# Patient Record
Sex: FEMALE | Race: WHITE
[De-identification: names, ages, dates, MRNs, and addresses within clinical notes are randomized per-mention and may not be internally consistent; named-entity substitution may affect disease eponyms.]

---

## 2019-08-13 ENCOUNTER — HOSPITAL ENCOUNTER (EMERGENCY)
Dept: HOSPITAL 97 - ER | Age: 54
Discharge: HOME | End: 2019-08-13
Payer: SELF-PAY

## 2019-08-13 DIAGNOSIS — E87.6: ICD-10-CM

## 2019-08-13 DIAGNOSIS — F10.129: ICD-10-CM

## 2019-08-13 DIAGNOSIS — F55.8: ICD-10-CM

## 2019-08-13 DIAGNOSIS — M79.622: Primary | ICD-10-CM
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
## 2019-08-13 LAB
ALBUMIN SERPL BCP-MCNC: 3.3 G/DL (ref 3.4–5)
ALP SERPL-CCNC: 84 U/L (ref 45–117)
ALT SERPL W P-5'-P-CCNC: 30 U/L (ref 12–78)
AST SERPL W P-5'-P-CCNC: 28 U/L (ref 15–37)
BUN BLD-MCNC: 13 MG/DL (ref 7–18)
GLUCOSE SERPLBLD-MCNC: 116 MG/DL (ref 74–106)
HCT VFR BLD CALC: 34.3 % (ref 36–45)
INR BLD: 0.96
LYMPHOCYTES # SPEC AUTO: 1.6 K/UL (ref 0.7–4.9)
METHAMPHET UR QL SCN: POSITIVE
PMV BLD: 7.3 FL (ref 7.6–11.3)
POTASSIUM SERPL-SCNC: 3.2 MMOL/L (ref 3.5–5.1)
RBC # BLD: 3.99 M/UL (ref 3.86–4.86)
THC SERPL-MCNC: NEGATIVE NG/ML

## 2019-08-13 PROCEDURE — 80048 BASIC METABOLIC PNL TOTAL CA: CPT

## 2019-08-13 PROCEDURE — 96368 THER/DIAG CONCURRENT INF: CPT

## 2019-08-13 PROCEDURE — 80307 DRUG TEST PRSMV CHEM ANLYZR: CPT

## 2019-08-13 PROCEDURE — 71045 X-RAY EXAM CHEST 1 VIEW: CPT

## 2019-08-13 PROCEDURE — 85025 COMPLETE CBC W/AUTO DIFF WBC: CPT

## 2019-08-13 PROCEDURE — 82962 GLUCOSE BLOOD TEST: CPT

## 2019-08-13 PROCEDURE — 80320 DRUG SCREEN QUANTALCOHOLS: CPT

## 2019-08-13 PROCEDURE — 93005 ELECTROCARDIOGRAM TRACING: CPT

## 2019-08-13 PROCEDURE — 99285 EMERGENCY DEPT VISIT HI MDM: CPT

## 2019-08-13 PROCEDURE — 85730 THROMBOPLASTIN TIME PARTIAL: CPT

## 2019-08-13 PROCEDURE — 80329 ANALGESICS NON-OPIOID 1 OR 2: CPT

## 2019-08-13 PROCEDURE — 96365 THER/PROPH/DIAG IV INF INIT: CPT

## 2019-08-13 PROCEDURE — 70450 CT HEAD/BRAIN W/O DYE: CPT

## 2019-08-13 PROCEDURE — 85610 PROTHROMBIN TIME: CPT

## 2019-08-13 PROCEDURE — 80076 HEPATIC FUNCTION PANEL: CPT

## 2019-08-13 PROCEDURE — 36415 COLL VENOUS BLD VENIPUNCTURE: CPT

## 2019-08-13 PROCEDURE — 81003 URINALYSIS AUTO W/O SCOPE: CPT

## 2019-08-13 NOTE — RAD REPORT
EXAM DESCRIPTION:  CT - Ct Stroke Brain Wo Cont - 8/13/2019 8:23 pm

 

CLINICAL HISTORY:  SLURRED SPEECH

CVA symptomology.

 

COMPARISON:  No comparisons

 

TECHNIQUE:  All CT scans are performed using dose optimization technique as appropriate and may inclu
de automated exposure control or mA/KV adjustment according to patient size.

 

FINDINGS:  No intracranial hemorrhage, hydrocephalus or extra-axial fluid collection.No areas of brai
n edema or evidence of midline shift.

 

The paranasal sinuses and mastoids are clear. The calvarium is intact.

 

IMPRESSION:  No acute intracranial abnormality.

 

 The findings were discussed with ER physician Dr. Rosenthal on 08/13/2019 at 8:17 p.m. by telephone.

## 2019-08-13 NOTE — RAD REPORT
EXAM DESCRIPTION:  RAD - Humerus Left - 8/13/2019 9:13 pm

 

CLINICAL HISTORY:  PAIN

 

COMPARISON:  <Comparisons>

 

FINDINGS:  No acute fracture or dislocation evident.

## 2019-08-13 NOTE — RAD REPORT
EXAM DESCRIPTION:  RAD - Chest Single View - 8/13/2019 8:29 pm

 

CLINICAL HISTORY:  stroke protocol

Chest pain.

 

COMPARISON:  No comparisons

 

FINDINGS:  Portable technique limits examination quality.

 

The lungs are grossly clear. The heart is mildly prominent in size. No displaced fractures.

 

IMPRESSION:  No acute intrathoracic process suspected.

## 2019-08-13 NOTE — EDPHYS
Physician Documentation                                                                           

 [facility]                                                                 

Name: Snow Maynard                                                                             

Age: 53 yrs                                                                                       

Sex: Female                                                                                       

: 1965                                                                                   

MRN: [id number]                                                                                   

Arrival Date: 2019                                                                          

Time: 20:01                                                                                       

Account#: [id number]                                                                            

Bed 2                                                                                             

Private MD:                                                                                       

ED Physician Adrien Chen                                                                      

HPI:                                                                                              

                                                                                             

20:21 This 53 yrs old  Female presents to ER via Wheelchair with complaints of Arm   suellen 

      Pain.                                                                                       

20:21 The patient or guardian complains of.                                                   suellen 

                                                                                                  

OB/GYN:                                                                                           

20:17 LMP N/A - Hysterectomy                                                                  jd3 

                                                                                                  

Historical:                                                                                       

- Allergies:                                                                                      

20:17 No Known Allergies;                                                                     jd3 

- Home Meds:                                                                                      

20:17 None [Active];                                                                          jd3 

- PMHx:                                                                                           

20:17 Hypertension;                                                                           jd3 

- PSHx:                                                                                           

20:17 Hysterectomy;                                                                           jd3 

                                                                                                  

- Immunization history:: Adult Immunizations unknown.                                             

- Social history:: Smoking status: unknown.                                                       

- Ebola Screening: : Patient negative for fever greater than or equal to 101.5 degrees            

  Fahrenheit, and additional compatible Ebola Virus Disease symptoms.                             

                                                                                                  

                                                                                                  

ROS:                                                                                              

21:00 Constitutional: Negative for fever, chills, and weight loss, Eyes: Negative for injury, suellen 

      pain, redness, and discharge, ENT: Negative for injury, pain, and discharge, Neck:          

      Negative for injury, pain, and swelling, Cardiovascular: Negative for chest pain,           

      palpitations, and edema, Respiratory: Negative for shortness of breath, cough,              

      wheezing, and pleuritic chest pain, Abdomen/GI: Negative for abdominal pain, nausea,        

      vomiting, diarrhea, and constipation, Back: Negative for injury and pain, : Negative      

      for injury, bleeding, discharge, and swelling, Skin: Negative for injury, rash, and         

      discoloration, Neuro: Negative for headache, weakness, numbness, tingling, and seizure,     

      Psych: Negative for depression, anxiety, suicide ideation, homicidal ideation, and          

      hallucinations, Allergy/Immunology: Negative for hives, rash, and allergies, Endocrine:     

      Negative for neck swelling, polydipsia, polyuria, polyphagia, and marked weight             

      changes, Hematologic/Lymphatic: Negative for swollen nodes, abnormal bleeding, and          

      unusual bruising.                                                                           

21:00 MS/extremity: Positive for injury or acute deformity, decreased range of motion, pain,      

      of the anterior aspect of left shoulder, left bicep, posterior aspect of left shoulder      

      and left tricep.                                                                            

                                                                                                  

Exam:                                                                                             

21:00 Constitutional:  This is a well developed, well nourished patient who is awake, alert,  suellen 

      and in no acute distress. Head/Face:  Normocephalic, atraumatic. Eyes:  Pupils equal        

      round and reactive to light, extra-ocular motions intact.  Lids and lashes normal.          

      Conjunctiva and sclera are non-icteric and not injected.  Cornea within normal limits.      

      Periorbital areas with no swelling, redness, or edema. ENT:  Nares patent. No nasal         

      discharge, no septal abnormalities noted.  Tympanic membranes are normal and external       

      auditory canals are clear.  Oropharynx with no redness, swelling, or masses, exudates,      

      or evidence of obstruction, uvula midline.  Mucous membranes moist. Neck:  Trachea          

      midline, no thyromegaly or masses palpated, and no cervical lymphadenopathy.  Supple,       

      full range of motion without nuchal rigidity, or vertebral point tenderness.  No            

      Meningismus. Chest/axilla:  Normal chest wall appearance and motion.  Nontender with no     

      deformity.  No lesions are appreciated. Cardiovascular:  Regular rate and rhythm with a     

      normal S1 and S2.  No gallops, murmurs, or rubs.  Normal PMI, no JVD.  No pulse             

      deficits. Respiratory:  Lungs have equal breath sounds bilaterally, clear to                

      auscultation and percussion.  No rales, rhonchi or wheezes noted.  No increased work of     

      breathing, no retractions or nasal flaring. Abdomen/GI:  Soft, non-tender, with normal      

      bowel sounds.  No distension or tympany.  No guarding or rebound.  No evidence of           

      tenderness throughout. Back:  No spinal tenderness.  No costovertebral tenderness.          

      Full range of motion. Skin:  Warm, dry with normal turgor.  Normal color with no            

      rashes, no lesions, and no evidence of cellulitis. Neuro:  Awake and alert, GCS 15,         

      oriented to person, place, time, and situation.  Cranial nerves II-XII grossly intact.      

      Motor strength 5/5 in all extremities.  Sensory grossly intact.  Cerebellar exam            

      normal.  Normal gait. Psych:  Awake, alert, with orientation to person, place and time.     

       Behavior, mood, and affect are within normal limits.                                       

21:00 Musculoskeletal/extremity: ROM: limited active range of motion, limited passive range       

      of motion, limited active range of motion due to pain, limited passive range of motion      

      due to pain, Circulation is intact in all extremities. Sensation intact. Compartment        

      Syndrome exam of affected extremity: is normal. DVT Exam: no swelling, negative Homans'     

      sign noted on exam, no appreciated bluish discoloration, no erythema, no increased          

      warmth, pain, tenderness.                                                                   

                                                                                                  

Vital Signs:                                                                                      

20:17  / 97; Pulse 118; Resp 19 S; Temp 98.5(TE); Pulse Ox 100% on R/A; Weight 61.23 kg jd3 

      (R); Height 5 ft. 4 in. (162.56 cm) (R); Pain 6/10;                                         

21:22  / 88; Pulse 94; Resp 18; Pulse Ox 100% on R/A;                                   ao  

22:05  / 70; Pulse 90; Resp 16; Pulse Ox 99% on R/A;                                    rr5 

20:17 Body Mass Index 23.17 (61.23 kg, 162.56 cm)                                             jd3 

                                                                                                  

NIH Stroke Scale Scores:                                                                          

21:51 NIHSS Score: 0                                                                          suellen 

23:11 NIHSS Score: 5                                                                          ao  

                                                                                                  

Sigurd Coma Score:                                                                               

20:15 Eye Response: spontaneous(4). Verbal Response: oriented(5). Motor Response: obeys       rr5 

      commands(6). Total: 15.                                                                     

22:05 Eye Response: spontaneous(4). Verbal Response: oriented(5). Motor Response: obeys       rr5 

      commands(6). Total: 15.                                                                     

                                                                                                  

MDM:                                                                                              

20:11 Patient medically screened.                                                             [facility] 

21:01 Data reviewed: vital signs, nurses notes, lab test result(s), EKG, radiologic studies,  [facility] 

      CT scan, plain films.                                                                       

                                                                                                  

                                                                                             

20:18 Order name: Basic Metabolic Panel; Complete Time: 21:29                                   

                                                                                             

20:18 Order name: CBC with Diff; Complete Time: 20:59                                           

                                                                                             

20:18 Order name: Protime (+inr); Complete Time: 20:59                                          

                                                                                             

20:18 Order name: Ptt, Activated; Complete Time: 20:59                                          

                                                                                             

20:20 Order name: ETOH Level; Complete Time: 20:59                                            [facility] 

                                                                                             

20:18 Order name: CT Stroke Brain w/o Contrast; Complete Time: 20:59                            

                                                                                             

20:18 Order name: Stroke CXR 1 View; Complete Time: 20:59                                       

                                                                                             

20:20 Order name: Salicylate; Complete Time: 21:29                                            [facility] 

                                                                                             

20:20 Order name: Urine Drug Screen                                                           [facility] 

                                                                                             

20:37 Order name: Liver (Hepatic) Function; Complete Time: 21:29                              [facility]

                                                                                             

20:37 Order name: Acetaminophen Level; Complete Time: 21:29                                   [facility]

                                                                                             

22:04 Order name: Urine Dipstick--Ancillary (enter results)                                   ag4 

                                                                                             

20:18 Order name: EKG; Complete Time: 20:20                                                     

                                                                                             

20:18 Order name: Accucheck; Complete Time: 20:                                               

                                                                                             

20:18 Order name: Cardiac monitoring; Complete Time: 20:                                      

                                                                                             

20:18 Order name: EKG - Nurse/Tech; Complete Time: 20:                                        

                                                                                             

20:18 Order name: IV Saline Lock; Complete Time: 20:                                          

                                                                                             

20:18 Order name: Labs collected and sent; Complete Time: 20:                                 

                                                                                             

20:18 Order name: NPO; Complete Time: 20:                                                     

                                                                                             

20:18 Order name: O2 Per Protocol; Complete Time: 20:                                         

                                                                                             

20:18 Order name: O2 Sat Monitoring; Complete Time: 20:                                       

                                                                                             

20:18 Order name: Stroke Swallow Screen; Complete Time: 20:                                   

                                                                                             

20:59 Order name: Humerus Left XRAY                                                           [facility] 

                                                                                             

20:20 Order name: Urine Dipstick-Ancillary (obtain specimen); Complete Time: 22:12            [facility] 

                                                                                             

21:00 Order name: Sling; Complete Time: 22:10                                                 [facility] 

                                                                                                  

Administered Medications:                                                                         

20:31 Drug: NS 0.9% 1000 ml Route: IV; Rate: 1 bolus; Site: right forearm;                    rr5 

22:00 Follow up: Response: No adverse reaction; IV Status: Completed infusion; IV Intake:     rr5 

      1000ml                                                                                      

20:31 Drug: Thiamine 100 mg Route: IV; Rate: bolus; Site: right forearm;                      rr5 

22:00 Follow up: Response: No adverse reaction; IV Status: Completed infusion                 rr5 

20:31 Drug: foLIC Acid 1 mg Route: IVPB; Site: right forearm;                                 rr5 

22:00 Follow up: Response: No adverse reaction; IV Status: Completed infusion                 rr5 

21:11 Drug: Aspirin Chewable Tablet 162 mg Route: PO;                                         rr5 

22:11 Follow up: Response: No adverse reaction                                                rr5 

22:00 Drug: Potassium Effervescent Tablet 50 mEq Route: PO;                                   rr5 

22:15 Follow up: Response: Medication administered at discharge.                              rr5 

                                                                                                  

                                                                                                  

Point of Care Testing:                                                                            

      Blood Glucose:                                                                              

20:17 Blood Glucose: 120 mg/dL;                                                               jd3 

      Ranges:                                                                                     

      Critical Glucose Levels:Adult <50 mg/dl or >400 mg/dl  <40 mg/dl or >180 mg/dl       

Disposition:                                                                                      

19 21:31 Discharged to Home. Impression: Fall due to bumping against object, Pain in        

  left upper arm - contusion, Alcohol abuse with intoxication, Abuse of                           

  non-psychoactive substances, Hypokalemia.                                                       

- Condition is Stable.                                                                            

- Discharge Instructions: Alcohol Intoxication, Potassium Content of Foods, Fall                  

  Prevention in the Home, Musculoskeletal Pain, Substance Use Disorder, Alcohol                   

  Intoxication, Easy-to-Read, Alcohol Abuse and Nutrition, Fall Prevention in the Home,           

  Easy-to-Read, Aspirin and Your Heart, Hypokalemia.                                              

                                                                                                  

- Medication Reconciliation Form, Thank You Letter, Antibiotic Education, Prescription            

  Opioid Use form.                                                                                

- Follow up: Private Physician; When: 2 - 3 days; Reason: Recheck today's complaints,             

  Continuance of care, Re-evaluation by your physician.                                           

- Problem is new.                                                                                 

- Symptoms have improved.                                                                         

                                                                                                  

                                                                                                  

                                                                                                  

                                                                                                  

NIH Stroke Scale - NIH Stroke Score                                                               

Date: 2019                                                                                  

Time: 21:51                                                                                       

Total Score = 0                                                                                   

  1a. Level of Consciousness (LOC) - 0(Alert)                                                     

  1b. Level of Consciousness (LOC) (Year \T\ Age) - 0(Both)                                       

  1c. LOC Commands (Open \T\ Closes Eyes/) - 0(Both)                                          

   2. Best Gaze (Lateral Gaze Paresis) - 0(Normal)                                                

   3. Visual Field Loss - 0(No visual loss)                                                       

   4. Facial Palsy - 0(Normal)                                                                    

  5a. Left Arm: Motor (10-second hold) - 0(No drift)                                              

  5b. Right Arm: Motor (10-second hold) - 0(No drift)                                             

  6a. Left Leg: Motor (5-second hold - always test supine) - 0(No drift)                          

  6b. Right Leg: Motor (5-second hold - always test supine) - 0(No drift)                         

   7. Limb Ataxia (finger/nose \T\ heel/shin - test with eyes open) - 0(Absent)                   

   8. Sensory Loss (pinprick arms/legs/face) - 0(Normal)                                          

   9. Best Language: Aphasia (description/naming/reading) - 0(No aphasia)                         

  10. Dysarthria (speech clarity - read or repeat words) - 0(Normal)                              

  11. Extinction and Inattention (visual/tactile/auditory/spatial/personal) - 0(No                

      abnormality)                                                                                

Initials: suellen                                                                                     

                                                                                                  

                                                                                                  

NIH Stroke Scale - NIH Stroke Score                                                               

Date: 2019                                                                                  

Time: 23:11                                                                                       

Total Score = 5                                                                                   

  1a. Level of Consciousness (LOC) - 0(Alert)                                                     

  1b. Level of Consciousness (LOC) (Year \T\ Age) - 0(Both)                                       

  1c. LOC Commands (Open \T\ Closes Eyes/) - 0(Both)                                          

   2. Best Gaze (Lateral Gaze Paresis) - 0(Normal)                                                

   3. Visual Field Loss - 0(No visual loss)                                                       

   4. Facial Palsy - 0(Normal)                                                                    

  5a. Left Arm: Motor (10-second hold) - 1(Drift)                                                 

  5b. Right Arm: Motor (10-second hold) - 1(Drift)                                                

  6a. Left Leg: Motor (5-second hold - always test supine) - 1(Drift)                             

  6b. Right Leg: Motor (5-second hold - always test supine) - 1(Drift)                            

   7. Limb Ataxia (finger/nose \T\ heel/shin - test with eyes open) - 0(Absent)                   

   8. Sensory Loss (pinprick arms/legs/face) - 0(Normal)                                          

   9. Best Language: Aphasia (description/naming/reading) - 1(Mild to moderate                    

      aphasia)                                                                                    

  10. Dysarthria (speech clarity - read or repeat words) - 0(Normal)                              

  11. Extinction and Inattention (visual/tactile/auditory/spatial/personal) - 0(No                

      abnormality)                                                                                

Initials: ao                                                                                      

                                                                                                  

Signatures:                                                                                       

Dispatcher [employer]                           EDMS                                                 

Adrien Chen MD MD cha Ballard, Brenda, RN RN bb Davies, Jonathon, RN RN [id number] Roque, Raymond, RN                      RN   rr5                                                  

                                                                                                  

Corrections: (The following items were deleted from the chart)                                    

20:37 20:21 ACETAMINOPHEN+C.LAB.BRZ ordered. EDMS                                     EDMS        

20:37 20:21 HEPATIC FUNCTION+C.LAB.BRZ ordered. EDMS                                  EDMS        

22:14 21:31 2019 21:31 Discharged to Home. Impression: Fall due to bumping      rr5         

      against object; Pain in left upper arm - contusion; Alcohol abuse with                      

      intoxication; Abuse of non-psychoactive substances; Hypokalemia. Condition is               

      Stable. Discharge Instructions: Alcohol Intoxication, Fall Prevention in the                

      Home, Musculoskeletal Pain, Substance Use Disorder, Alcohol Intoxication,                   

      Easy-to-Read, Alcohol Abuse and Nutrition, Fall Prevention in the Home,                     

      Easy-to-Read, Aspirin and Your Heart, Potassium Content of Foods, Hypokalemia.              

      Forms are Medication Reconciliation Form, Thank You Letter, Antibiotic                      

      Education, Prescription Opioid Use. Follow up: Private Physician; When: 2 - 3               

      days; Reason: Recheck today's complaints, Continuance of care, Re-evaluation by             

      your physician. Problem is new. Symptoms have improved. suellen                                 

                                                                                                  

**************************************************************************************************

## 2019-08-13 NOTE — ER
Nurse's Notes                                                                                     

 [facility]                                                                 

Name: Snow Maynard                                                                             

Age: 53 yrs                                                                                       

Sex: Female                                                                                       

: 1965                                                                                   

MRN: [id number]                                                                                   

Arrival Date: 2019                                                                          

Time: 20:01                                                                                       

Account#: [id number]                                                                            

Bed 2                                                                                             

Private MD:                                                                                       

Diagnosis: Fall due to bumping against object;Pain in left upper arm-contusion;Alcohol abuse with 

  intoxication;Abuse of non-psychoactive substances;Hypokalemia                                   

                                                                                                  

Presentation:                                                                                     

                                                                                             

20:11 Presenting complaint: Patient states: "my left arm is hurting me. I took some           jd3 

      medication I was told was OxyContin, but I don't know what it was. that was earlier in      

      the morning though." the patient's adult child stated: "all of a sudden she was not         

      able to stay awake to well and slurring her speech.". Transition of care: patient was       

      not received from another setting of care. An acute neurological deficit is present.        

      The charge nurse has been notified. The patient has been moved to a treatment area.         

      Pre-hospital glucose is not applicable to this patient. Onset of symptoms was 2019. Risk Assessment: Do you want to hurt yourself or someone else? Patient            

      reports no desire to harm self or others. Initial Sepsis Screen: Does the patient meet      

      any 2 criteria? HR > 90 bpm. No. Patient's initial sepsis screen is negative. Does the      

      patient have a suspected source of infection? No. Patient's initial sepsis screen is        

      negative. Care prior to arrival: None.                                                      

20:11 Method Of Arrival: Wheelchair                                                           jd3 

20:11 Acuity: SALO 2                                                                           jd3 

                                                                                                  

Triage Assessment:                                                                                

20:10 The onset of the patients symptoms was less than three hours ago. General: Behavior is  ao  

      calm, cooperative. General: Appears in no apparent distress. comfortable, Behavior is       

      calm, cooperative. Pain: Complains of pain in left arm. EENT: No signs and/or symptoms      

      were reported regarding the EENT system. Neuro: Level of Consciousness is awake, obeys      

      commands, lethargic, Oriented to person, place, time, Speech is slurred, Facial             

      symmetry appears normal, Pupils are PERRLA, Intact Reports weakness in Generalize.          

      Cardiovascular: Capillary refill is > 3 seconds. Respiratory: Airway is patent              

      Respiratory effort is even, unlabored, Respiratory pattern is regular, symmetrical. GI:     

      No signs and/or symptoms were reported involving the gastrointestinal system. : No        

      signs and/or symptoms were reported regarding the genitourinary system. Derm: Skin has      

      lesions on Both arms and legs Skin is pink, warm \T\ dry. normal, Skin temperature is       

      warm. Musculoskeletal: Circulation, motion, and sensation intact. Range of motion:          

      intact in all extremities.                                                                  

20:20 The onset of the patients symptoms was 2019 at 18:00. Pain: Complains of     jd3 

      pain in left arm. Neuro: Level of Consciousness is awake, obeys commands, lethargic,        

      Oriented to person, place, time, situation, Speech is slurred, Reports weakness in left     

      arm.                                                                                        

                                                                                                  

OB/GYN:                                                                                           

20:17 LMP N/A - Hysterectomy                                                                  jd3 

                                                                                                  

Stroke Activation:                                                                                

 Physician: Stroke Attending; Name: alysha; Notified At: ; Arrived At:                                 

 Physician: Chief Stroke Resident; Name: ; Notified At: ; Arrived At:                             

 Physician: Stroke Resident; Name: ; Notified At: ; Arrived At:                                   

 Physician: ED Attending; Name: Dr Chen; Notified At:  20:03; Arrived At:           

 Physician: ED Resident; Name: ; Notified At: ; Arrived At:                                       

                                                                                                  

Historical:                                                                                       

- Allergies:                                                                                      

20:17 No Known Allergies;                                                                     jd3 

- Home Meds:                                                                                      

20:17 None [Active];                                                                          jd3 

- PMHx:                                                                                           

20:17 Hypertension;                                                                           jd3 

- PSHx:                                                                                           

20:17 Hysterectomy;                                                                           jd3 

                                                                                                  

- Immunization history:: Adult Immunizations unknown.                                             

- Social history:: Smoking status: unknown.                                                       

- Ebola Screening: : Patient negative for fever greater than or equal to 101.5 degrees            

  Fahrenheit, and additional compatible Ebola Virus Disease symptoms.                             

                                                                                                  

                                                                                                  

Screenin:10 VAN Screening: Arm Drift: Minor drift. Visual Disturbance: No visual disturbance noted. ao  

      Aphasia: Expressive aphasia noted. Provider notified of +VAN scoring. Neglect: No           

      neglect noted. The patient has not been NPO before screening. The patient is alert,         

      able to follow commands. The patient exhibits slurred or garbled speech. Provider           

      notified of indication for Speech Therapy consult. The patient is not exhibiting            

      difficulty speaking. The patient is exhibiting difficulty understanding words. The          

      patient is able to swallow own secretions with no drooling or need for suction. Patient     

      tolerated one teaspoon of water. No drooling, immediate coughing, gurgling, or clearing     

      of the throat was noted. The patient tolerated 90mL of water. No drooling, immediate        

      coughing, gurgling, or clearing of the throat was noted. The patient passed the bedside     

      swallow screening. Oral medications may be given as ordered. Contact Physician for          

      further diet orders. Provider notified of bedside swallow screening results: Adrien Chen MD.                                                                                

20:24 Abuse screen: Denies threats or abuse. Denies injuries from another. Nutritional        rr5 

      screening: No deficits noted. Tuberculosis screening: No symptoms or risk factors           

      identified. Fall Risk IV access (20 points). Gait- Impaired (20 pts.). Total [place] Fall     

      Scale indicates Low Risk Score (25-44 pts). Fall prevention measures have been              

      instituted. Side Rails Up X 2 Placed close to Nursing Station Frequent Obs/Assesments       

      occuring Family Present and informed to notify staff if they need to leave bedside As       

      available Patient and Family Educated on Fall Prevention Program and strategies.            

                                                                                                  

Assessment:                                                                                       

20:05 VAN Scoring: Arm Drift: Minor drift Visual Disturbance: No visual disturbance noted.    ao  

      Aphasia: Expressive aphasia noted. Provider notified of +VAN scoring. Neglect: No           

      neglect noted. The patient has not been NPO before screening. The patient is alert, and     

      able to follow commands. The patient exhibits slurred or garbled speech. The patient is     

      exhibiting difficulty speaking. The patient does not exhibit difficulty understanding       

      words. The patient is able to swallow own secretions with no drooling or need for           

      suction. Patient tolerated one teaspoon of water. No drooling, immediate coughing,          

      gurgling, or clearing of the throat was noted. The patient tolerated 90mL of water. No      

      drooling, immediate coughing, gurgling, or clearing of the throat was noted. The            

      patient passed the bedside swallow screening. Oral medications may be given as ordered.     

      Contact Physician for further diet orders. Provider notified of bedside swallow             

      screening results: Adrien Chen MD.                                                       

21:22 Reassessment: Patient appears in no apparent distress at this time. Patient and/or      ao  

      family updated on plan of care and expected duration. Pain level reassessed. Patient in     

      bed with no SS of distress.                                                                 

22:13 Reassessment: Patient appears in no apparent distress at this time. Patient and/or      rr5 

      family updated on plan of care and expected duration. Pain level reassessed. discharge      

      instruction given and explained to patient and  verbalized understanding           

      without complaints made. Patient states feeling better. Patient states symptoms have        

      improved.                                                                                   

22:34 T-PA (Activase) Screening: Contraindications: Other: Pt negative for stroke per Dr sarthak Chen.                                                                                   

                                                                                                  

Vital Signs:                                                                                      

20:17  / 97; Pulse 118; Resp 19 S; Temp 98.5(TE); Pulse Ox 100% on R/A; Weight 61.23 kg jd3 

      (R); Height 5 ft. 4 in. (162.56 cm) (R); Pain 6/10;                                         

21:22  / 88; Pulse 94; Resp 18; Pulse Ox 100% on R/A;                                   ao  

22:05  / 70; Pulse 90; Resp 16; Pulse Ox 99% on R/A;                                    rr5 

20:17 Body Mass Index 23.17 (61.23 kg, 162.56 cm)                                             jd3 

                                                                                                  

Marcel Coma Score:                                                                               

20:15 Eye Response: spontaneous(4). Verbal Response: oriented(5). Motor Response: obeys       rr5 

      commands(6). Total: 15.                                                                     

22:05 Eye Response: spontaneous(4). Verbal Response: oriented(5). Motor Response: obeys       rr5 

      commands(6). Total: 15.                                                                     

                                                                                                  

NIH Stroke Scale Scores:                                                                          

21:51 NIHSS Score: 0                                                                          suellen 

23:11 NIHSS Score: 5                                                                          ao  

                                                                                                  

ED Course:                                                                                        

20:01 Patient arrived in ED.                                                                  cl3 

20:11 Adrien Chen MD is Attending Physician.                                             suellen 

20:15 Cardiac monitor on. Pulse ox on. NIBP on.                                               rr5 

20:15 Patient has correct armband on for positive identification. Placed in gown. Bed in low  rr5 

      position. Call light in reach. Side rails up X2.                                            

20:15 Inserted saline lock: 18 gauge in right forearm, using aseptic technique. Blood         rr5 

      collected.                                                                                  

20:15 Patient maintains SpO2 saturation greater than 95% on room air.                         rr5 

20:16 Triage completed.                                                                       jd3 

20:20 Arm band placed on Patient placed in an exam room, on a stretcher, on cardiac monitor,  jd3 

      on pulse oximetry.                                                                          

20:24 Tab Rangel, RN is Primary Nurse.                                                       ao  

20:25 CT Stroke Brain w/o Contrast In Process Unspecified.                                    EDMS

20:30 Stroke CXR 1 View In Process Unspecified.                                               EDMS

21:14 Humerus Left XRAY In Process Unspecified.                                               EDMS

22:05 Shoulder immobilizer applied on left shoulder.                                          rr5 

22:12 No provider procedures requiring assistance completed. IV discontinued, intact,         rr5 

      bleeding controlled, No redness/swelling at site. Pressure dressing applied.                

                                                                                                  

Administered Medications:                                                                         

20:31 Drug: NS 0.9% 1000 ml Route: IV; Rate: 1 bolus; Site: right forearm;                    rr5 

22:00 Follow up: Response: No adverse reaction; IV Status: Completed infusion; IV Intake:     rr5 

      1000ml                                                                                      

20:31 Drug: Thiamine 100 mg Route: IV; Rate: bolus; Site: right forearm;                      rr5 

22:00 Follow up: Response: No adverse reaction; IV Status: Completed infusion                 rr5 

20:31 Drug: foLIC Acid 1 mg Route: IVPB; Site: right forearm;                                 rr5 

22:00 Follow up: Response: No adverse reaction; IV Status: Completed infusion                 rr5 

21:11 Drug: Aspirin Chewable Tablet 162 mg Route: PO;                                         rr5 

22:11 Follow up: Response: No adverse reaction                                                rr5 

22:00 Drug: Potassium Effervescent Tablet 50 mEq Route: PO;                                   rr5 

22:15 Follow up: Response: Medication administered at discharge.                              rr5 

                                                                                                  

                                                                                                  

Point of Care Testing:                                                                            

      Blood Glucose:                                                                              

20:17 Blood Glucose: 120 mg/dL;                                                               jd3 

      Ranges:                                                                                     

                                                                                                  

Intake:                                                                                           

22:00 IV: 1000ml; Total: 1000ml.                                                              rr5 

                                                                                                  

Outcome:                                                                                          

21:31 Discharge ordered by MD.                                                                suellen 

22:12 Discharged to home ambulatory, with family.                                             rr5 

22:12 Condition: stable                                                                           

22:12 Discharge instructions given to patient, family, Instructed on discharge instructions,      

      follow up and referral plans. Demonstrated understanding of instructions, follow-up         

      care.                                                                                       

22:14 Patient left the ED.                                                                    rr5 

                                                                                                  

                                                                                                  

NIH Stroke Scale - NIH Stroke Score                                                               

Date: 2019                                                                                  

Time: 21:51                                                                                       

Total Score = 0                                                                                   

  1a. Level of Consciousness (LOC) - 0(Alert)                                                     

  1b. Level of Consciousness (LOC) (Year \T\ Age) - 0(Both)                                       

  1c. LOC Commands (Open \T\ Closes Eyes/) - 0(Both)                                          

   2. Best Gaze (Lateral Gaze Paresis) - 0(Normal)                                                

   3. Visual Field Loss - 0(No visual loss)                                                       

   4. Facial Palsy - 0(Normal)                                                                    

  5a. Left Arm: Motor (10-second hold) - 0(No drift)                                              

  5b. Right Arm: Motor (10-second hold) - 0(No drift)                                             

  6a. Left Leg: Motor (5-second hold - always test supine) - 0(No drift)                          

  6b. Right Leg: Motor (5-second hold - always test supine) - 0(No drift)                         

   7. Limb Ataxia (finger/nose \T\ heel/shin - test with eyes open) - 0(Absent)                   

   8. Sensory Loss (pinprick arms/legs/face) - 0(Normal)                                          

   9. Best Language: Aphasia (description/naming/reading) - 0(No aphasia)                         

  10. Dysarthria (speech clarity - read or repeat words) - 0(Normal)                              

  11. Extinction and Inattention (visual/tactile/auditory/spatial/personal) - 0(No                

      abnormality)                                                                                

Initials: suellen                                                                                     

                                                                                                  

                                                                                                  

NIH Stroke Scale - NIH Stroke Score                                                               

Date: 2019                                                                                  

Time: 23:11                                                                                       

Total Score = 5                                                                                   

  1a. Level of Consciousness (LOC) - 0(Alert)                                                     

  1b. Level of Consciousness (LOC) (Year \T\ Age) - 0(Both)                                       

  1c. LOC Commands (Open \T\ Closes Eyes/) - 0(Both)                                          

   2. Best Gaze (Lateral Gaze Paresis) - 0(Normal)                                                

   3. Visual Field Loss - 0(No visual loss)                                                       

   4. Facial Palsy - 0(Normal)                                                                    

  5a. Left Arm: Motor (10-second hold) - 1(Drift)                                                 

  5b. Right Arm: Motor (10-second hold) - 1(Drift)                                                

  6a. Left Leg: Motor (5-second hold - always test supine) - 1(Drift)                             

  6b. Right Leg: Motor (5-second hold - always test supine) - 1(Drift)                            

   7. Limb Ataxia (finger/nose \T\ heel/shin - test with eyes open) - 0(Absent)                   

   8. Sensory Loss (pinprick arms/legs/face) - 0(Normal)                                          

   9. Best Language: Aphasia (description/naming/reading) - 1(Mild to moderate                    

      aphasia)                                                                                    

  10. Dysarthria (speech clarity - read or repeat words) - 0(Normal)                              

  11. Extinction and Inattention (visual/tactile/auditory/spatial/personal) - 0(No                

      abnormality)                                                                                

Initials: ao                                                                                      

                                                                                                  

Signatures:                                                                                       

Dispatcher [employer]                           EDAdrien Rivas MD MD cha Ortiz, Alex RN                         Jorge Lagso RN RN jd3 Roque, Raymond, RN                      RN   rr5                                                  

Raysa Meng                                cl3                                                  

                                                                                                  

Corrections: (The following items were deleted from the chart)                                    

20:32 20:15 Inserted saline lock: 20 gauge in right forearm, using aseptic technique. rr5         

      Blood collected. rr5                                                                        

23:13 20:05 NIHSS Score: 10 ao                                                        ao          

                                                                                                  

**************************************************************************************************

## 2019-08-14 NOTE — EKG
Test Date:    2019-08-13               Test Time:    20:20:38

Technician:   ARTURO                                     

                                                     

MEASUREMENT RESULTS:                                       

Intervals:                                           

Rate:         97                                     

MD:           154                                    

QRSD:         110                                    

QT:           380                                    

QTc:          482                                    

Axis:                                                

P:            61                                     

MD:           154                                    

QRS:          1                                      

T:            62                                     

                                                     

INTERPRETIVE STATEMENTS:                                       

                                                     

Normal sinus rhythm

Possible Left atrial enlargement

Incomplete right bundle branch block

Septal infarct, age undetermined

Abnormal ECG

No previous ECG available for comparison



Electronically Signed On 08-14-19 09:47:56 CDT by Nathanael Costa